# Patient Record
(demographics unavailable — no encounter records)

---

## 2024-10-17 NOTE — PHYSICAL EXAM
[General Appearance - In No Acute Distress] : in no acute distress [Sclera] : the sclera and conjunctiva were normal [Outer Ear] : the ears and nose were normal in appearance [Neck Appearance] : the appearance of the neck was normal [Auscultation Breath Sounds / Voice Sounds] : lungs were clear to auscultation bilaterally [Heart Sounds] : normal S1 and S2 [Edema] : there was no peripheral edema [Abdomen Tenderness] : non-tender [No Palpable Adenopathy] : no palpable adenopathy [Musculoskeletal - Swelling] : no joint swelling [] : no rash [No Focal Deficits] : no focal deficits [Affect] : the affect was normal

## 2024-10-17 NOTE — ASSESSMENT
[FreeTextEntry1] : 59M HIV controlled on Genvoya  HIV - CBC/CMP, T cells, VL - continue ARV  GI/microscopic colitis - needs to see Dr. Callahan - was due for colonoscopy 2020 - he will call to make an apopintment  BPH - Tamsulosin - f/u  (Dr. Escamilla)  low vitamin D - check level today  HCM - Last eye exam:  recently - Last colonoscopy was 2015, supposed to have repeat 2020. - Last dental: last cleaning Sept 2024, also had a crown placed - Last dermatology:  up to date - Bone Density:  osteopenia noted (stable) Nov 2023 (next due Nov 2025) - vaccinations:  reordered shingrix, needs flu today, did not get covid updated vaccine - has PCP  f/u 4-6 months

## 2024-10-17 NOTE — HISTORY OF PRESENT ILLNESS
[FreeTextEntry1] : 59M Osteopenia, BPH, HIV diagnosed 2000.  Doing well currently on Genvoya.  No missed doses.  Feeling well.  Father diagnosed with leukemia last year and recently had to have a new stent.  Patient wants to see a cardiologist.  Otherwise, no fever.  no CP or SOB.  No n/v/d.  No abdominal pain.  No dysuria.    ROS:  no fever / chills.  no cough / sob.  no chest pain.  saw  and put back on tamsulosin due to decreased flow.  no dizziness.  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  recent cold during the holidays. ROS:  otherwise negative  Last eye exam:  this year Last colonoscopy was 2015, supposed to have repeat 2020. Last dental: last cleaning Sept 2024 Last dermatology:  appointment in last month Bone Density:  Nov 2023 (next due Nov 2025)